# Patient Record
Sex: FEMALE | Race: WHITE | NOT HISPANIC OR LATINO | URBAN - METROPOLITAN AREA
[De-identification: names, ages, dates, MRNs, and addresses within clinical notes are randomized per-mention and may not be internally consistent; named-entity substitution may affect disease eponyms.]

---

## 2017-03-02 PROBLEM — N20.0 KIDNEY STONE: Status: ACTIVE | Noted: 2017-03-02

## 2017-04-19 PROCEDURE — 82365 CALCULUS SPECTROSCOPY: CPT | Performed by: UROLOGY

## 2019-10-16 ENCOUNTER — APPOINTMENT (OUTPATIENT)
Dept: FAMILY MEDICINE | Age: 64
End: 2019-10-16

## 2019-10-29 ENCOUNTER — APPOINTMENT (OUTPATIENT)
Dept: FAMILY MEDICINE | Age: 64
End: 2019-10-29

## 2020-06-10 ENCOUNTER — NURSE TRIAGE (OUTPATIENT)
Dept: FAMILY MEDICINE CLINIC | Facility: CLINIC | Age: 65
End: 2020-06-10

## 2020-06-10 NOTE — TELEPHONE ENCOUNTER
Action Requested: Summary for Provider     []  Critical Lab, Recommendations Needed  [] Need Additional Advice  []   FYI    []   Need Orders  [] Need Medications Sent to Pharmacy  []  Other     SUMMARY: patch of hives to Left lower leg with redness and swe

## 2020-06-11 ENCOUNTER — OFFICE VISIT (OUTPATIENT)
Dept: FAMILY MEDICINE CLINIC | Facility: CLINIC | Age: 65
End: 2020-06-11
Payer: COMMERCIAL

## 2020-06-11 VITALS
DIASTOLIC BLOOD PRESSURE: 74 MMHG | HEART RATE: 92 BPM | WEIGHT: 270 LBS | TEMPERATURE: 97 F | BODY MASS INDEX: 49.69 KG/M2 | SYSTOLIC BLOOD PRESSURE: 135 MMHG | HEIGHT: 62 IN

## 2020-06-11 DIAGNOSIS — L03.116 CELLULITIS OF LEFT LOWER EXTREMITY: Primary | ICD-10-CM

## 2020-06-11 DIAGNOSIS — M99.03 LUMBAR REGION SOMATIC DYSFUNCTION: ICD-10-CM

## 2020-06-11 PROCEDURE — 98925 OSTEOPATH MANJ 1-2 REGIONS: CPT | Performed by: FAMILY MEDICINE

## 2020-06-11 PROCEDURE — 99213 OFFICE O/P EST LOW 20 MIN: CPT | Performed by: FAMILY MEDICINE

## 2020-06-11 RX ORDER — CEPHALEXIN 500 MG/1
500 CAPSULE ORAL 3 TIMES DAILY
Qty: 21 CAPSULE | Refills: 0 | Status: SHIPPED | OUTPATIENT
Start: 2020-06-11

## 2020-06-11 RX ORDER — FUROSEMIDE 20 MG/1
20 TABLET ORAL DAILY
Qty: 30 TABLET | Refills: 0 | Status: SHIPPED | OUTPATIENT
Start: 2020-06-11

## 2020-06-11 NOTE — PROGRESS NOTES
HPI:    Patient ID: Ezekiel Larose is a 71year old female. HPI  Pt sudden onset redness left lower leg following intense bout of exercise. .. she started adding incline to her treadmill. nmild tenderness and redness is not improving.       Review of S Constitutional: She appears well-developed and well-nourished. Cardiovascular: Normal rate and regular rhythm. Pulmonary/Chest: Effort normal and breath sounds normal.   Abdominal: Soft. Musculoskeletal:         General: Tenderness present.       Left

## 2021-03-13 DIAGNOSIS — Z23 NEED FOR VACCINATION: ICD-10-CM

## 2021-03-22 ENCOUNTER — IMMUNIZATION (OUTPATIENT)
Dept: LAB | Facility: HOSPITAL | Age: 66
End: 2021-03-22
Attending: HOSPITALIST
Payer: COMMERCIAL

## 2021-03-22 DIAGNOSIS — Z23 NEED FOR VACCINATION: Primary | ICD-10-CM

## 2021-03-22 PROCEDURE — 0011A SARSCOV2 VAC 100MCG/0.5ML IM: CPT

## 2021-04-19 ENCOUNTER — IMMUNIZATION (OUTPATIENT)
Dept: LAB | Facility: HOSPITAL | Age: 66
End: 2021-04-19
Attending: EMERGENCY MEDICINE
Payer: COMMERCIAL

## 2021-04-19 DIAGNOSIS — Z23 NEED FOR VACCINATION: Primary | ICD-10-CM

## 2021-04-19 PROCEDURE — 0012A SARSCOV2 VAC 100MCG/0.5ML IM: CPT

## 2023-11-06 ENCOUNTER — TELEPHONE (OUTPATIENT)
Dept: FAMILY MEDICINE CLINIC | Facility: CLINIC | Age: 68
End: 2023-11-06

## 2023-11-06 NOTE — TELEPHONE ENCOUNTER
Patient has previously been seen by Rosamaria Galindo and she called to schedule an appt for manipulation. She wants to be seen 11/10/2023. I informed her next appt is not until December 8th and she declined to see Dr that was available that day stating \"I have to see Rosamaria Galindo because he knows what to do\".  Is it possible to get her in?

## 2023-11-10 ENCOUNTER — OFFICE VISIT (OUTPATIENT)
Dept: FAMILY MEDICINE CLINIC | Facility: CLINIC | Age: 68
End: 2023-11-10
Payer: MEDICARE

## 2023-11-10 VITALS
HEIGHT: 64 IN | SYSTOLIC BLOOD PRESSURE: 157 MMHG | TEMPERATURE: 98 F | DIASTOLIC BLOOD PRESSURE: 76 MMHG | WEIGHT: 257.63 LBS | HEART RATE: 85 BPM | BODY MASS INDEX: 43.98 KG/M2 | OXYGEN SATURATION: 98 %

## 2023-11-10 DIAGNOSIS — M99.04 SACRAL REGION SOMATIC DYSFUNCTION: ICD-10-CM

## 2023-11-10 DIAGNOSIS — M99.03 LUMBAR REGION SOMATIC DYSFUNCTION: Primary | ICD-10-CM

## 2023-11-10 RX ORDER — METAXALONE 800 MG/1
800 TABLET ORAL 3 TIMES DAILY PRN
Qty: 21 TABLET | Refills: 1 | Status: SHIPPED | OUTPATIENT
Start: 2023-11-10

## 2023-11-10 NOTE — PROCEDURES
Patient treated with soft tissue and myofacial release.   Able to add muscle energy to the right lower lumbar and sacral.  Tolerated well

## 2024-05-23 ENCOUNTER — OFFICE VISIT (OUTPATIENT)
Dept: FAMILY MEDICINE CLINIC | Facility: CLINIC | Age: 69
End: 2024-05-23

## 2024-05-23 ENCOUNTER — TELEPHONE (OUTPATIENT)
Dept: FAMILY MEDICINE CLINIC | Facility: CLINIC | Age: 69
End: 2024-05-23

## 2024-05-23 VITALS
HEIGHT: 64 IN | HEART RATE: 78 BPM | WEIGHT: 261.25 LBS | BODY MASS INDEX: 44.6 KG/M2 | SYSTOLIC BLOOD PRESSURE: 179 MMHG | DIASTOLIC BLOOD PRESSURE: 90 MMHG

## 2024-05-23 DIAGNOSIS — M99.02 THORACIC REGION SOMATIC DYSFUNCTION: ICD-10-CM

## 2024-05-23 DIAGNOSIS — M54.2 CERVICALGIA OF OCCIPITO-ATLANTO-AXIAL REGION: Primary | ICD-10-CM

## 2024-05-23 DIAGNOSIS — M99.01 CERVICAL SOMATIC DYSFUNCTION: ICD-10-CM

## 2024-05-23 DIAGNOSIS — R03.0 ELEVATED BLOOD PRESSURE READING: ICD-10-CM

## 2024-05-23 PROCEDURE — 99213 OFFICE O/P EST LOW 20 MIN: CPT | Performed by: FAMILY MEDICINE

## 2024-05-23 PROCEDURE — 98926 OSTEOPATH MANJ 3-4 REGIONS: CPT | Performed by: FAMILY MEDICINE

## 2024-05-23 RX ORDER — METAXALONE 800 MG/1
800 TABLET ORAL 3 TIMES DAILY PRN
Qty: 21 TABLET | Refills: 1 | Status: SHIPPED | OUTPATIENT
Start: 2024-05-23

## 2024-05-23 RX ORDER — DICLOFENAC SODIUM 75 MG/1
75 TABLET, DELAYED RELEASE ORAL 2 TIMES DAILY
Qty: 30 TABLET | Refills: 1 | Status: SHIPPED | OUTPATIENT
Start: 2024-05-23

## 2024-05-23 NOTE — PROCEDURES
Left anterior OA somatic dysfunction , left cervicals and upper thoracic somatic dysfunction treated with myofacial release and muscle energy.

## 2024-05-23 NOTE — PROGRESS NOTES
Subjective:   Sirisha Rodrigues is a 68 year old female who presents for Headache (Pt presents with constant headaches since the ends of march. Pt has been taking tylenol over the counter. ) and Musculoskeletal Problem (Pt presents with stiff neck in back of the head x 2 months.)       History/Other:    Chief Complaint Reviewed and Verified  Nursing Notes Reviewed and   Verified  Tobacco Reviewed  Allergies Reviewed  Problem List Reviewed    Medical History Reviewed  Surgical History Reviewed  OB Status Reviewed    Family History Reviewed  Social History Reviewed         Tobacco:  She has never smoked tobacco.    Current Outpatient Medications   Medication Sig Dispense Refill    Metaxalone 800 MG Oral Tab Take 1 tablet (800 mg total) by mouth 3 (three) times daily as needed for Pain. 21 tablet 1    diclofenac 75 MG Oral Tab EC Take 1 tablet (75 mg total) by mouth 2 (two) times daily. 30 tablet 1    B Complex-Biotin-FA (TH VITAMIN B 50/B-COMPLEX) Oral Tab Take 1 tablet by mouth.      Estradiol-Estriol-Progesterone (BIEST/PROGESTERONE) Transdermal Cream Place 1 Application onto the skin.      Omega-3 Fatty Acids (FISH OIL) 500 MG Oral Cap Take by mouth.      Multiple Vitamin (THERA/BETA-CAROTENE) Oral Tab Take 1 tablet by mouth.      Ubiquinol 100 MG Oral Cap Take 1 capsule by mouth.      Ascorbic Acid (VITAMIN C) 1000 MG Oral Tab Take 1 tablet (1,000 mg total) by mouth.      Arginine 500 MG Oral Tab Take 1 tablet (500 mg total) by mouth.      PROAIR  (90 BASE) MCG/ACT Inhalation Aero Soln   99         Review of Systems:  Review of Systems   Constitutional: Negative.    Musculoskeletal:  Positive for neck pain and neck stiffness.   Neurological:  Negative for weakness and numbness.         Objective:   BP (!) 179/90   Pulse 78   Ht 5' 4\" (1.626 m)   Wt 261 lb 4 oz (118.5 kg)   BMI 44.84 kg/m²  Estimated body mass index is 44.84 kg/m² as calculated from the following:    Height as of this encounter:  5' 4\" (1.626 m).    Weight as of this encounter: 261 lb 4 oz (118.5 kg).  Physical Exam  Vitals reviewed.   Musculoskeletal:      Cervical back: Tenderness present. Decreased range of motion.      Comments: Left occiput and upper cervical somatic dysfunction.  Upper thoracic somatic dysfunction.      Left anterior OA somatic dysfunction , left cervicals and upper thoracic somatic dysfunction treated with myofacial release and muscle energy.        Assessment & Plan:   1. Cervicalgia of naamznpu-ujbtzle-khdsu region (Primary)  2. Cervical somatic dysfunction  3. Thoracic region somatic dysfunction  4. Elevated blood pressure reading  Other orders  -     Metaxalone; Take 1 tablet (800 mg total) by mouth 3 (three) times daily as needed for Pain.  Dispense: 21 tablet; Refill: 1  -     Diclofenac Sodium; Take 1 tablet (75 mg total) by mouth 2 (two) times daily.  Dispense: 30 tablet; Refill: 1        No follow-ups on file.    Serafin Clarke DO, 5/23/2024, 5:41 PM

## 2024-05-23 NOTE — TELEPHONE ENCOUNTER
Patient states she was in today for an appointment . The medications antiinflammatory mediations she has taken are diclofenac 75 mg oral tablets and Skelaxin 800mg

## 2024-05-23 NOTE — TELEPHONE ENCOUNTER
Left detailed message (per CHARLENE) informing patient of diclofenac and metaxalone prescriptions sent to [facility] in [place] today. Left call back number for any questions.    MyChart message also sent.

## 2024-05-31 ENCOUNTER — HOSPITAL ENCOUNTER (OUTPATIENT)
Dept: GENERAL RADIOLOGY | Facility: HOSPITAL | Age: 69
Discharge: HOME | End: 2024-05-31
Attending: FAMILY MEDICINE
Payer: MEDICARE

## 2024-05-31 ENCOUNTER — OFFICE VISIT (OUTPATIENT)
Dept: FAMILY MEDICINE CLINIC | Facility: CLINIC | Age: 69
End: 2024-05-31

## 2024-05-31 VITALS
SYSTOLIC BLOOD PRESSURE: 148 MMHG | BODY MASS INDEX: 44.39 KG/M2 | TEMPERATURE: 98 F | HEIGHT: 64 IN | DIASTOLIC BLOOD PRESSURE: 72 MMHG | OXYGEN SATURATION: 99 % | WEIGHT: 260 LBS | HEART RATE: 77 BPM

## 2024-05-31 DIAGNOSIS — M25.511 ACUTE PAIN OF RIGHT SHOULDER: ICD-10-CM

## 2024-05-31 DIAGNOSIS — M54.2 CERVICALGIA OF OCCIPITO-ATLANTO-AXIAL REGION: ICD-10-CM

## 2024-05-31 DIAGNOSIS — M54.2 CERVICALGIA OF OCCIPITO-ATLANTO-AXIAL REGION: Primary | ICD-10-CM

## 2024-05-31 DIAGNOSIS — M99.01 CERVICAL SOMATIC DYSFUNCTION: ICD-10-CM

## 2024-05-31 PROBLEM — M81.0 AGE-RELATED OSTEOPOROSIS WITHOUT CURRENT PATHOLOGICAL FRACTURE: Status: ACTIVE | Noted: 2023-05-15

## 2024-05-31 PROCEDURE — 99214 OFFICE O/P EST MOD 30 MIN: CPT | Performed by: FAMILY MEDICINE

## 2024-05-31 PROCEDURE — 98925 OSTEOPATH MANJ 1-2 REGIONS: CPT | Performed by: FAMILY MEDICINE

## 2024-05-31 PROCEDURE — 73030 X-RAY EXAM OF SHOULDER: CPT | Performed by: FAMILY MEDICINE

## 2024-05-31 PROCEDURE — 72050 X-RAY EXAM NECK SPINE 4/5VWS: CPT | Performed by: FAMILY MEDICINE

## 2024-05-31 RX ORDER — SCOLOPAMINE TRANSDERMAL SYSTEM 1 MG/1
1 PATCH, EXTENDED RELEASE TRANSDERMAL
COMMUNITY
Start: 2022-06-28 | End: 2024-05-31 | Stop reason: ALTCHOICE

## 2024-05-31 RX ORDER — AMOXICILLIN 500 MG/1
500 CAPSULE ORAL 2 TIMES DAILY
COMMUNITY
Start: 2023-12-28

## 2024-05-31 RX ORDER — FLUTICASONE PROPIONATE 50 MCG
1 SPRAY, SUSPENSION (ML) NASAL DAILY
COMMUNITY
Start: 2023-02-15 | End: 2024-05-31 | Stop reason: ALTCHOICE

## 2024-05-31 RX ORDER — OMEPRAZOLE 40 MG/1
40 CAPSULE, DELAYED RELEASE ORAL DAILY
COMMUNITY
Start: 2022-10-13 | End: 2024-05-31 | Stop reason: ALTCHOICE

## 2024-05-31 NOTE — PROGRESS NOTES
Subjective:   Sirisha Rodrigues is a 68 year old female who presents for Follow - Up (On neck and shoulders)   No improvement and had side effect to medication so stopped.     History/Other:    Chief Complaint Reviewed and Verified  Nursing Notes Reviewed and   Verified  Tobacco Reviewed  Allergies Reviewed  Medications Reviewed    Problem List Reviewed  Medical History Reviewed  Surgical History   Reviewed  Family History Reviewed  Social History Reviewed         Tobacco:  She has never smoked tobacco.    Current Outpatient Medications   Medication Sig Dispense Refill    Acetaminophen 325 MG Oral Cap Take by mouth As Directed.      amoxicillin 500 MG Oral Cap Take 1 capsule (500 mg total) by mouth 2 (two) times daily.      mupirocin 2 % External Ointment Apply one application 2 times daily to lower leg      potassium bicarbonate 25 MEQ Oral Effer Tab Take 1 tablet (25 mEq total) by mouth 2 (two) times daily.      Metaxalone 800 MG Oral Tab Take 1 tablet (800 mg total) by mouth 3 (three) times daily as needed for Pain. 21 tablet 1    B Complex-Biotin-FA (TH VITAMIN B 50/B-COMPLEX) Oral Tab Take 1 tablet by mouth.      Estradiol-Estriol-Progesterone (BIEST/PROGESTERONE) Transdermal Cream Place 1 Application onto the skin.      Omega-3 Fatty Acids (FISH OIL) 500 MG Oral Cap Take by mouth.      Multiple Vitamin (THERA/BETA-CAROTENE) Oral Tab Take 1 tablet by mouth.      Ubiquinol 100 MG Oral Cap Take 1 capsule by mouth.      Ascorbic Acid (VITAMIN C) 1000 MG Oral Tab Take 1 tablet (1,000 mg total) by mouth.      Arginine 500 MG Oral Tab Take 1 tablet (500 mg total) by mouth.      PROAIR  (90 BASE) MCG/ACT Inhalation Aero Soln   99         Review of Systems:  Review of Systems   Constitutional: Negative.    Musculoskeletal:  Positive for back pain, neck pain and neck stiffness.   Neurological:  Negative for dizziness, weakness and numbness.         Objective:   /72   Pulse 77   Temp 98 °F (36.7  °C) (Temporal)   Ht 5' 4\" (1.626 m)   Wt 260 lb (117.9 kg)   SpO2 99%   BMI 44.63 kg/m²  Estimated body mass index is 44.63 kg/m² as calculated from the following:    Height as of this encounter: 5' 4\" (1.626 m).    Weight as of this encounter: 260 lb (117.9 kg).  Physical Exam  Vitals reviewed.   Musculoskeletal:      Comments: Occiput, cervical and upper thoracic somatic dysfunction   Neurological:      Sensory: Sensation is intact.      Motor: Motor function is intact.       Left anterior occiput, bilateral upper cervical somatic dysfunction treated with OMT using muscle energy and myofacial release.      Assessment & Plan:   1. Cervicalgia of nhxhuhqr-jxosgmc-ctwrj region (Primary)  -     XR CERVICAL SPINE (4VIEWS) (CPT=72050); Future; Expected date: 05/31/2024  -     XR SHOULDER, COMPLETE (MIN 2 VIEWS), RIGHT (CPT=73030); Future; Expected date: 05/31/2024  2. Cervical somatic dysfunction  3. Acute pain of right shoulder  -     XR CERVICAL SPINE (4VIEWS) (CPT=72050); Future; Expected date: 05/31/2024  -     XR SHOULDER, COMPLETE (MIN 2 VIEWS), RIGHT (CPT=73030); Future; Expected date: 05/31/2024    OMT today.  Stopped med.  X ray ordered.  Follow up eval in one week     No follow-ups on file.    Serafin Clarke DO, 5/31/2024, 4:46 PM

## 2024-05-31 NOTE — PROCEDURES
Left anterior occiput, bilateral upper cervical somatic dysfunction treated with OMT using muscle energy and myofacial release.

## 2024-06-10 ENCOUNTER — OFFICE VISIT (OUTPATIENT)
Dept: FAMILY MEDICINE CLINIC | Facility: CLINIC | Age: 69
End: 2024-06-10

## 2024-06-10 VITALS
OXYGEN SATURATION: 96 % | HEIGHT: 64 IN | SYSTOLIC BLOOD PRESSURE: 145 MMHG | WEIGHT: 261 LBS | HEART RATE: 94 BPM | BODY MASS INDEX: 44.56 KG/M2 | DIASTOLIC BLOOD PRESSURE: 73 MMHG

## 2024-06-10 DIAGNOSIS — M67.911 DYSFUNCTION OF RIGHT ROTATOR CUFF: ICD-10-CM

## 2024-06-10 DIAGNOSIS — M99.01 CERVICAL SOMATIC DYSFUNCTION: Primary | ICD-10-CM

## 2024-06-10 PROBLEM — I70.0 ATHEROSCLEROSIS OF ABDOMINAL AORTA (HCC): Status: ACTIVE | Noted: 2024-06-10

## 2024-06-10 PROBLEM — I70.0 ATHEROSCLEROSIS OF ABDOMINAL AORTA: Status: ACTIVE | Noted: 2024-06-10

## 2024-06-10 PROBLEM — E66.01 MORBID OBESITY (HCC): Status: ACTIVE | Noted: 2024-06-10

## 2024-06-10 RX ORDER — TRIAMCINOLONE ACETONIDE 1 MG/G
CREAM TOPICAL
COMMUNITY
Start: 2024-06-05

## 2024-06-11 NOTE — PROGRESS NOTES
Subjective:   Sirisha Rodrigues is a 68 year old female who presents for Follow - Up (On Xrays results and right shoulder pain)       History/Other:    Chief Complaint Reviewed and Verified  Nursing Notes Reviewed and   Verified  Tobacco Reviewed  Allergies Reviewed  Medications Reviewed    Problem List Reviewed  Medical History Reviewed  Surgical History   Reviewed  Family History Reviewed  Social History Reviewed         Tobacco:  She has never smoked tobacco.    Current Outpatient Medications   Medication Sig Dispense Refill    triamcinolone 0.1 % External Cream Apply to affected areas 2-3 times daily. Taper as discussed.      Acetaminophen 325 MG Oral Cap Take by mouth As Directed.      amoxicillin 500 MG Oral Cap Take 1 capsule (500 mg total) by mouth 2 (two) times daily.      mupirocin 2 % External Ointment Apply one application 2 times daily to lower leg      potassium bicarbonate 25 MEQ Oral Effer Tab Take 1 tablet (25 mEq total) by mouth 2 (two) times daily.      Metaxalone 800 MG Oral Tab Take 1 tablet (800 mg total) by mouth 3 (three) times daily as needed for Pain. 21 tablet 1    B Complex-Biotin-FA (TH VITAMIN B 50/B-COMPLEX) Oral Tab Take 1 tablet by mouth.      Estradiol-Estriol-Progesterone (BIEST/PROGESTERONE) Transdermal Cream Place 1 Application onto the skin.      Omega-3 Fatty Acids (FISH OIL) 500 MG Oral Cap Take by mouth.      Multiple Vitamin (THERA/BETA-CAROTENE) Oral Tab Take 1 tablet by mouth.      Ubiquinol 100 MG Oral Cap Take 1 capsule by mouth.      Ascorbic Acid (VITAMIN C) 1000 MG Oral Tab Take 1 tablet (1,000 mg total) by mouth.      Arginine 500 MG Oral Tab Take 1 tablet (500 mg total) by mouth.      PROAIR  (90 BASE) MCG/ACT Inhalation Aero Soln   99         Review of Systems:  Review of Systems   Musculoskeletal:  Positive for arthralgias, back pain and neck pain.        Right shoulder pain   Neurological:  Negative for weakness and numbness.         Objective:    /73   Pulse 94   Ht 5' 4\" (1.626 m)   Wt 261 lb (118.4 kg)   SpO2 96%   BMI 44.80 kg/m²  Estimated body mass index is 44.8 kg/m² as calculated from the following:    Height as of this encounter: 5' 4\" (1.626 m).    Weight as of this encounter: 261 lb (118.4 kg).  Physical Exam  Vitals reviewed.   Musculoskeletal:      Right shoulder: Tenderness present. Decreased range of motion.      Cervical back: Tenderness present. Decreased range of motion.      Thoracic back: Tenderness present.      Comments: Impingement signs right shoulder     Right sided OA and cervical somatic dysfunction treated with soft tissue and muscle energy.  Tolerated well. Improved ROM.        Assessment & Plan:   1. Cervical somatic dysfunction (Primary)  2. Dysfunction of right rotator cuff  -     Physical Therapy Referral - [facility]    She complains of acute on chronic right shoulder pain.  Will obtain XR and refer to PT.  See in office for neck pain.        No follow-ups on file.    Serafin Clarke DO, 6/10/2024, 7:37 PM

## 2024-06-11 NOTE — PROCEDURES
Right sided OA and cervical somatic dysfunction treated with soft tissue and muscle energy.  Tolerated well. Improved ROM.

## 2024-06-20 ENCOUNTER — OFFICE VISIT (OUTPATIENT)
Dept: FAMILY MEDICINE CLINIC | Facility: CLINIC | Age: 69
End: 2024-06-20

## 2024-06-20 VITALS
HEIGHT: 64 IN | BODY MASS INDEX: 44.73 KG/M2 | SYSTOLIC BLOOD PRESSURE: 150 MMHG | HEART RATE: 83 BPM | DIASTOLIC BLOOD PRESSURE: 84 MMHG | OXYGEN SATURATION: 97 % | WEIGHT: 262 LBS

## 2024-06-20 DIAGNOSIS — M67.911 DYSFUNCTION OF RIGHT ROTATOR CUFF: ICD-10-CM

## 2024-06-20 DIAGNOSIS — M99.02 THORACIC REGION SOMATIC DYSFUNCTION: ICD-10-CM

## 2024-06-20 DIAGNOSIS — R60.0 BILATERAL LEG EDEMA: ICD-10-CM

## 2024-06-20 DIAGNOSIS — M99.01 CERVICAL SOMATIC DYSFUNCTION: Primary | ICD-10-CM

## 2024-06-20 PROCEDURE — 99214 OFFICE O/P EST MOD 30 MIN: CPT | Performed by: FAMILY MEDICINE

## 2024-06-20 RX ORDER — HYDROCHLOROTHIAZIDE 25 MG/1
25 TABLET ORAL DAILY
Qty: 30 TABLET | Refills: 0 | Status: SHIPPED | OUTPATIENT
Start: 2024-06-20

## 2024-06-20 NOTE — PROCEDURES
OMT cervical and thoracic somatic dysfunction.  Soft tissue and myofacial release . Tolerated well.

## 2024-06-20 NOTE — PROGRESS NOTES
Subjective:   Sirisha Rodrigues is a 68 year old female who presents for Procedure (Manipulation/) and Hives (Right leg hives)       History/Other:    Chief Complaint Reviewed and Verified  Nursing Notes Reviewed and   Verified  Allergies Reviewed  Medications Reviewed         Tobacco:  She has never smoked tobacco.    Current Outpatient Medications   Medication Sig Dispense Refill    hydroCHLOROthiazide 25 MG Oral Tab Take 1 tablet (25 mg total) by mouth daily. 30 tablet 0    triamcinolone 0.1 % External Cream Apply to affected areas 2-3 times daily. Taper as discussed.      Acetaminophen 325 MG Oral Cap Take by mouth As Directed.      amoxicillin 500 MG Oral Cap Take 1 capsule (500 mg total) by mouth 2 (two) times daily.      mupirocin 2 % External Ointment Apply one application 2 times daily to lower leg      potassium bicarbonate 25 MEQ Oral Effer Tab Take 1 tablet (25 mEq total) by mouth 2 (two) times daily.      Metaxalone 800 MG Oral Tab Take 1 tablet (800 mg total) by mouth 3 (three) times daily as needed for Pain. 21 tablet 1    B Complex-Biotin-FA (TH VITAMIN B 50/B-COMPLEX) Oral Tab Take 1 tablet by mouth.      Estradiol-Estriol-Progesterone (BIEST/PROGESTERONE) Transdermal Cream Place 1 Application onto the skin.      Omega-3 Fatty Acids (FISH OIL) 500 MG Oral Cap Take by mouth.      Multiple Vitamin (THERA/BETA-CAROTENE) Oral Tab Take 1 tablet by mouth.      Ubiquinol 100 MG Oral Cap Take 1 capsule by mouth.      Ascorbic Acid (VITAMIN C) 1000 MG Oral Tab Take 1 tablet (1,000 mg total) by mouth.      Arginine 500 MG Oral Tab Take 1 tablet (500 mg total) by mouth.      PROAIR  (90 BASE) MCG/ACT Inhalation Aero Soln   99         Review of Systems:  Review of Systems   Constitutional: Negative.    Respiratory: Negative.     Cardiovascular:  Positive for leg swelling.   Musculoskeletal:  Positive for arthralgias and back pain.        Right shoulder pain.  Neck pain         Objective:   /84    Pulse 83   Ht 5' 4\" (1.626 m)   Wt 262 lb (118.8 kg)   SpO2 97%   BMI 44.97 kg/m²  Estimated body mass index is 44.97 kg/m² as calculated from the following:    Height as of this encounter: 5' 4\" (1.626 m).    Weight as of this encounter: 262 lb (118.8 kg).  Physical Exam  Vitals reviewed.   Cardiovascular:      Rate and Rhythm: Normal rate and regular rhythm.   Musculoskeletal:      Right shoulder: Tenderness present. Decreased range of motion.      Right lower le+ Edema present.      Left lower le+ Edema present.      Comments: Cervical somatic dysfunction right.         OMT cervical and thoracic somatic dysfunction.  Soft tissue and myofacial release . Tolerated well.      Assessment & Plan:   1. Cervical somatic dysfunction (Primary)  2. Thoracic region somatic dysfunction  3. Dysfunction of right rotator cuff  4. Bilateral leg edema  Other orders  -     hydroCHLOROthiazide; Take 1 tablet (25 mg total) by mouth daily.  Dispense: 30 tablet; Refill: 0    Reviewed XR.  Significant OA spondylosis of cervical spine. The OMT is helping.  I recommend she see ortho for her shoulder rotator cuff. I detected ankle swelling.  Will start diuretic.  If worse should have furthe investigation but this is a chronic problem for her.  She goes to a lymphedema clinic for help.      Return in about 2 weeks (around 2024).    Serafin Clarke DO, 2024, 5:54 PM

## 2024-06-25 ENCOUNTER — NURSE TRIAGE (OUTPATIENT)
Dept: FAMILY MEDICINE CLINIC | Facility: CLINIC | Age: 69
End: 2024-06-25

## 2024-06-25 RX ORDER — CEFADROXIL 500 MG/1
500 CAPSULE ORAL 2 TIMES DAILY
Qty: 20 CAPSULE | Refills: 0 | Status: SHIPPED | OUTPATIENT
Start: 2024-06-25

## 2024-06-25 NOTE — TELEPHONE ENCOUNTER
Name and  verified. Pt informed. Verbalized good understanding of all with intent to comply.    Patient has an appointment on  and would like to know is she can wait until then or if she needs to be seen this week. Please advise. Thanks.    Future Appointments   Date Time Provider Department Center   2024  2:30 PM Serafin Clarke DO [facility]

## 2024-06-25 NOTE — TELEPHONE ENCOUNTER
Patient stated that Dr Clarke indicated for patient to call if symptoms in the legs were getting worse towards infection. Patient stated that taking hydrochlorothiazide 25mg daily. Swelling in the right leg is the same, the hives/rash is more red in the right leg and warm to the touch. Patient wanted antibiotic prescribed now as doctor suggested. Stated that she is sensitive to medications, but has had no issues with amoxicillin 500mg. Please advise.     6/20/2024 office notes:  Assessment & Plan:  1. Cervical somatic dysfunction (Primary)  2. Thoracic region somatic dysfunction  3. Dysfunction of right rotator cuff  4. Bilateral leg edema  Other orders  -     hydroCHLOROthiazide; Take 1 tablet (25 mg total) by mouth daily.  Dispense: 30 tablet; Refill: 0     Reviewed XR.  Significant OA spondylosis of cervical spine. The OMT is helping.  I recommend she see ortho for her shoulder rotator cuff. I detected ankle swelling.  Will start diuretic.  If worse should have furthe investigation but this is a chronic problem for her.  She goes to a lymphedema clinic for help.       Return in about 2 weeks (around 7/4/2024).     Serafin Clarke, , 6/20/2024, 5:54 PM

## 2024-06-27 NOTE — TELEPHONE ENCOUNTER
Spoke to patient (verified Name and ) and relayed Dr. Clarke's message below. Patient verbalized understanding and will keep the appointment. No further questions at this time.

## 2024-07-05 ENCOUNTER — OFFICE VISIT (OUTPATIENT)
Dept: FAMILY MEDICINE CLINIC | Facility: CLINIC | Age: 69
End: 2024-07-05
Payer: MEDICARE

## 2024-07-05 VITALS
DIASTOLIC BLOOD PRESSURE: 84 MMHG | BODY MASS INDEX: 44.9 KG/M2 | HEART RATE: 81 BPM | HEIGHT: 64 IN | SYSTOLIC BLOOD PRESSURE: 160 MMHG | WEIGHT: 263 LBS | OXYGEN SATURATION: 95 %

## 2024-07-05 DIAGNOSIS — M99.01 CERVICAL SOMATIC DYSFUNCTION: Primary | ICD-10-CM

## 2024-07-05 DIAGNOSIS — M99.02 THORACIC REGION SOMATIC DYSFUNCTION: ICD-10-CM

## 2024-07-05 DIAGNOSIS — R60.0 BILATERAL LEG EDEMA: ICD-10-CM

## 2024-07-05 DIAGNOSIS — M67.911 DYSFUNCTION OF RIGHT ROTATOR CUFF: ICD-10-CM

## 2024-07-05 RX ORDER — HYDROCHLOROTHIAZIDE 25 MG/1
25 TABLET ORAL DAILY
Qty: 30 TABLET | Refills: 2 | Status: SHIPPED | OUTPATIENT
Start: 2024-07-05

## 2024-07-05 RX ORDER — ALBUTEROL SULFATE 90 UG/1
2 AEROSOL, METERED RESPIRATORY (INHALATION) EVERY 4 HOURS PRN
Qty: 1 EACH | Refills: 1 | Status: SHIPPED | OUTPATIENT
Start: 2024-07-05

## 2024-07-05 NOTE — PROGRESS NOTES
Subjective:   Sirisha Rodrigues is a 68 year old female who presents for Neck Pain (Follow up in stiff neck and shoulder pain has MRI scheduled for 7/8)       History/Other:    Chief Complaint Reviewed and Verified  Nursing Notes Reviewed and   Verified  Tobacco Reviewed  Allergies Reviewed  Medications Reviewed    Problem List Reviewed  Medical History Reviewed  Surgical History   Reviewed  OB Status Reviewed  Family History Reviewed  Social History   Reviewed         Tobacco:  She has never smoked tobacco.    Current Outpatient Medications   Medication Sig Dispense Refill    hydroCHLOROthiazide 25 MG Oral Tab Take 1 tablet (25 mg total) by mouth daily. 30 tablet 2    albuterol (PROAIR HFA) 108 (90 Base) MCG/ACT Inhalation Aero Soln Inhale 2 puffs into the lungs every 4 (four) hours as needed for Wheezing. 1 each 1    Acetaminophen 325 MG Oral Cap Take 500 mg by mouth As Directed.      potassium bicarbonate 25 MEQ Oral Effer Tab Take 1 tablet (25 mEq total) by mouth 2 (two) times daily.      Metaxalone 800 MG Oral Tab Take 1 tablet (800 mg total) by mouth 3 (three) times daily as needed for Pain. 21 tablet 1    B Complex-Biotin-FA (TH VITAMIN B 50/B-COMPLEX) Oral Tab Take 1 tablet by mouth.      Estradiol-Estriol-Progesterone (BIEST/PROGESTERONE) Transdermal Cream Place 1 Application onto the skin.      Omega-3 Fatty Acids (FISH OIL) 500 MG Oral Cap Take by mouth.      Multiple Vitamin (THERA/BETA-CAROTENE) Oral Tab Take 1 tablet by mouth.      Ascorbic Acid (VITAMIN C) 1000 MG Oral Tab Take 1 tablet (1,000 mg total) by mouth.      Arginine 500 MG Oral Tab Take 1 tablet (500 mg total) by mouth.      PROAIR  (90 BASE) MCG/ACT Inhalation Aero Soln   99    amoxicillin 500 MG Oral Cap Take 1 capsule (500 mg total) by mouth 2 (two) times daily. (Patient not taking: Reported on 7/5/2024)           Review of Systems:  Review of Systems   Constitutional: Negative.    Cardiovascular:  Positive for leg  swelling.   Musculoskeletal:  Positive for arthralgias, neck pain and neck stiffness.   Skin:  Positive for rash.        Improved in leg   Neurological:  Negative for weakness and numbness.         Objective:   /84   Pulse 81   Ht 5' 4\" (1.626 m)   Wt 263 lb (119.3 kg)   SpO2 95%   BMI 45.14 kg/m²  Estimated body mass index is 45.14 kg/m² as calculated from the following:    Height as of this encounter: 5' 4\" (1.626 m).    Weight as of this encounter: 263 lb (119.3 kg).  Physical Exam  Vitals reviewed.   Constitutional:       Appearance: Normal appearance.   Musculoskeletal:      Right lower le+ Pitting Edema present.      Left lower le+ Pitting Edema present.      Comments: Occiput, cervical and upper thoracic somatic dysfunction   Skin:     Comments: Right lower extremity redness has improved and almost resolved.    Neurological:      Mental Status: She is alert.     Occiput, cervical, thoracic OMT performed using soft tissue, myofacial release.  Right occiput muscle energy performed.         Assessment & Plan:   1. Cervical somatic dysfunction (Primary)  -     OSTEOPATHIC MANIP,3-4 BODY REGN  2. Thoracic region somatic dysfunction  -     OSTEOPATHIC MANIP,3-4 BODY REGN  3. Bilateral leg edema  4. Dysfunction of right rotator cuff  Other orders  -     hydroCHLOROthiazide; Take 1 tablet (25 mg total) by mouth daily.  Dispense: 30 tablet; Refill: 2  -     Albuterol Sulfate HFA; Inhale 2 puffs into the lungs every 4 (four) hours as needed for Wheezing.  Dispense: 1 each; Refill: 1  OMT today which has been helping.  Seeing ortho and has shoulder MRI next week.  Recommend staying on diuretic daily now - edema better but not resolved and she continue to have elevated BP.  She is transitioning here for PCP since her current provider is retiring.  Refilled a med.        No follow-ups on file.    Serafin Clarke DO, 2024, 3:25 PM

## 2024-07-05 NOTE — PROCEDURES
Occiput, cervical, thoracic OMT performed using soft tissue, myofacial release.  Right occiput muscle energy performed.     pmd

## 2024-08-26 ENCOUNTER — MED REC SCAN ONLY (OUTPATIENT)
Dept: FAMILY MEDICINE CLINIC | Facility: CLINIC | Age: 69
End: 2024-08-26

## 2025-06-23 ENCOUNTER — TELEPHONE (OUTPATIENT)
Dept: FAMILY MEDICINE CLINIC | Facility: CLINIC | Age: 70
End: 2025-06-23